# Patient Record
(demographics unavailable — no encounter records)

---

## 2024-12-07 NOTE — DISCUSSION/SUMMARY
[FreeTextEntry1] : Recommend symptomatic therapy as needed including acetaminophen or ibuprofen for fever/pain. Increase fluid intake. Avoid airway irritants. Discussed use/ avoidance of cold symptom medication. Cool mist humidifier at nighttime. For congestion- vicks vapor rub, saline sprays/ steamed showers with bulb suction. If patient is of age use the Nedipot as tolerated PRN. Advised to call the office if symptoms do not improve in 3-5 days or sooner for change/concerns/wheezes/distress. Call with any new or worsening symptoms or concerns.  Recommend daily moisturizer and topical steroid prn for atopic dermatitis. Dry skin care reviewed:   - Take short showers/baths (avoid hot water)  - Use a mild soap (eg. CeraVe cleanser or Aquaphor)  - Use soap only on areas truly needed (underarms,groin,buttocks,fold areas, feet, face, hair)  - Pat off excess water and put moisturizer on immediately (within 3 min.)  Good moisturizing choices include:  1. Cetaphil cream (not baby Cetaphil)  2. CeraVe cream  3. Vanicream cream  4. Aquaphor ointment  5. Vaseline ointment  6. CeraVe ointment  - A moisturizer should always be applied after showering or bathing, but may be applied as many additional times as is necessary

## 2024-12-07 NOTE — HISTORY OF PRESENT ILLNESS
[de-identified] : coughing, came in contact with a positive flu  [FreeTextEntry6] : coughing, runny nose and low grade fevers today.  exposure to flu. good PO intake, UOP and BMs. Motrin given in office +eczema on hand

## 2024-12-07 NOTE — PHYSICAL EXAM
[NL] : moves all extremities x4, warm, well perfused x4 [de-identified] : patch noted to right wrist

## 2025-02-06 NOTE — DISCUSSION/SUMMARY
[FreeTextEntry1] : doing well  exam  normal  recovering  from  flu   had   positive  test   last   Thursday

## 2025-05-30 NOTE — DEVELOPMENTAL MILESTONES
[FreeTextEntry1] : verbal - comprehension much improved, still minimal words but also seeing apraxia specialist  gross motor - running, climbing, will be decreasing PT  fine motor- tracing, working w/ scissors social - has friend he plays with but overall still mostly cautious. doing well  potty training
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
## 2025-05-30 NOTE — DISCUSSION/SUMMARY
[School Readiness] : school readiness [Mental Health] : mental health [Nutrition and Physical Activity] : nutrition and physical activity [Oral Health] : oral health [Safety] : safety [FreeTextEntry1] : developmental delay (autism at 18 months) apraxia Continue balanced diet with all food groups. Brush teeth twice a day with toothbrush. Recommend visit to dentist. As per car seat 's guidelines, use foward-facing booster seat until child reaches highest weight/height for seat. Child needs to ride in a belt-positioning booster seat until  4 feet 9 inches has been reached and are between 8 and 12 years of age. Put child to sleep in own bed. Help child to maintain consistent daily routines and sleep schedule.  discussed. Ensure home is safe. Teach child about personal safety. Use consistent, positive discipline. Read aloud to child. Limit screen time to no more than 2 hours per day. DTaP-IPV given Check routine labs  D&B - diagnosed w/ autism at 18 months, doing well w/ services (gets PT OT speech). also has specialist for apraxia. has upcoming appt w/ [facility]

## 2025-05-30 NOTE — PHYSICAL EXAM

## 2025-06-20 NOTE — HISTORY OF PRESENT ILLNESS
[de-identified] :  POSSIBLE EAR PAIN  [FreeTextEntry6] : possible ear pain while at school today- holding left ear per father no viral symptoms, no fevers good PO intake, UOP and BMs